# Patient Record
Sex: MALE | Race: WHITE
[De-identification: names, ages, dates, MRNs, and addresses within clinical notes are randomized per-mention and may not be internally consistent; named-entity substitution may affect disease eponyms.]

---

## 2018-01-03 ENCOUNTER — HOSPITAL ENCOUNTER (OUTPATIENT)
Dept: HOSPITAL 96 - M.LAB | Age: 83
End: 2018-01-03
Attending: INTERNAL MEDICINE
Payer: MEDICARE

## 2018-01-03 DIAGNOSIS — R10.9: Primary | ICD-10-CM

## 2018-01-03 DIAGNOSIS — R63.0: ICD-10-CM

## 2018-01-03 DIAGNOSIS — N40.0: ICD-10-CM

## 2018-01-03 DIAGNOSIS — K21.9: ICD-10-CM

## 2018-01-03 LAB
ALBUMIN SERPL-MCNC: 3.4 G/DL (ref 3.4–5)
ALP SERPL-CCNC: 104 U/L (ref 46–116)
ALT SERPL-CCNC: 17 U/L (ref 30–65)
ANION GAP SERPL CALC-SCNC: 9 MMOL/L (ref 7–16)
AST SERPL-CCNC: 14 U/L (ref 15–37)
BILIRUB SERPL-MCNC: 0.9 MG/DL
BUN SERPL-MCNC: 16 MG/DL (ref 7–18)
CALCIUM SERPL-MCNC: 8.5 MG/DL (ref 8.5–10.1)
CHLORIDE SERPL-SCNC: 103 MMOL/L (ref 98–107)
CO2 SERPL-SCNC: 27 MMOL/L (ref 21–32)
CREAT SERPL-MCNC: 1.1 MG/DL (ref 0.6–1.3)
GLUCOSE SERPL-MCNC: 121 MG/DL (ref 70–99)
HCT VFR BLD CALC: 43.9 % (ref 42–52)
HGB BLD-MCNC: 15 GM/DL (ref 14–18)
MCH RBC QN AUTO: 29.9 PG (ref 26–34)
MCHC RBC AUTO-ENTMCNC: 34.1 G/DL (ref 28–37)
MCV RBC: 87.8 FL (ref 80–100)
MPV: 8.1 FL. (ref 7.2–11.1)
PLATELET COUNT*: 209 THOU/UL (ref 150–400)
POTASSIUM SERPL-SCNC: 3.9 MMOL/L (ref 3.5–5.1)
PROT SERPL-MCNC: 7.2 G/DL (ref 6.4–8.2)
RBC # BLD AUTO: 5 MIL/UL (ref 4.5–6)
RDW-CV: 13.5 % (ref 10.5–14.5)
SODIUM SERPL-SCNC: 139 MMOL/L (ref 136–145)
WBC # BLD AUTO: 7.7 THOU/UL (ref 4–11)

## 2018-11-18 ENCOUNTER — HOSPITAL ENCOUNTER (INPATIENT)
Dept: HOSPITAL 96 - M.ERS | Age: 83
LOS: 2 days | Discharge: HOME | DRG: 309 | End: 2018-11-20
Attending: INTERNAL MEDICINE | Admitting: INTERNAL MEDICINE
Payer: MEDICARE

## 2018-11-18 VITALS — DIASTOLIC BLOOD PRESSURE: 89 MMHG | SYSTOLIC BLOOD PRESSURE: 153 MMHG

## 2018-11-18 VITALS — DIASTOLIC BLOOD PRESSURE: 93 MMHG | SYSTOLIC BLOOD PRESSURE: 153 MMHG

## 2018-11-18 VITALS — WEIGHT: 196 LBS | BODY MASS INDEX: 27.44 KG/M2 | HEIGHT: 70.98 IN

## 2018-11-18 VITALS — SYSTOLIC BLOOD PRESSURE: 112 MMHG | DIASTOLIC BLOOD PRESSURE: 86 MMHG

## 2018-11-18 VITALS — DIASTOLIC BLOOD PRESSURE: 79 MMHG | SYSTOLIC BLOOD PRESSURE: 144 MMHG

## 2018-11-18 DIAGNOSIS — Z79.899: ICD-10-CM

## 2018-11-18 DIAGNOSIS — E44.1: ICD-10-CM

## 2018-11-18 DIAGNOSIS — Z88.0: ICD-10-CM

## 2018-11-18 DIAGNOSIS — Z79.82: ICD-10-CM

## 2018-11-18 DIAGNOSIS — Z96.643: ICD-10-CM

## 2018-11-18 DIAGNOSIS — K21.9: ICD-10-CM

## 2018-11-18 DIAGNOSIS — I49.5: Primary | ICD-10-CM

## 2018-11-18 DIAGNOSIS — N40.0: ICD-10-CM

## 2018-11-18 DIAGNOSIS — M19.90: ICD-10-CM

## 2018-11-18 LAB
ABSOLUTE BASOPHILS: 0 THOU/UL (ref 0–0.2)
ABSOLUTE EOSINOPHILS: 0 THOU/UL (ref 0–0.7)
ABSOLUTE MONOCYTES: 0.5 THOU/UL (ref 0–1.2)
ALBUMIN SERPL-MCNC: 3.2 G/DL (ref 3.4–5)
ALP SERPL-CCNC: 93 U/L (ref 46–116)
ALT SERPL-CCNC: 14 U/L (ref 30–65)
ANION GAP SERPL CALC-SCNC: 9 MMOL/L (ref 7–16)
APTT BLD: 23 SECONDS (ref 25–31.3)
AST SERPL-CCNC: 12 U/L (ref 15–37)
BASOPHILS NFR BLD AUTO: 0.3 %
BILIRUB SERPL-MCNC: 0.8 MG/DL
BILIRUB UR-MCNC: NEGATIVE MG/DL
BUN SERPL-MCNC: 15 MG/DL (ref 7–18)
CALCIUM SERPL-MCNC: 8.8 MG/DL (ref 8.5–10.1)
CHLORIDE SERPL-SCNC: 104 MMOL/L (ref 98–107)
CO2 SERPL-SCNC: 26 MMOL/L (ref 21–32)
COLOR UR: YELLOW
CREAT SERPL-MCNC: 1.1 MG/DL (ref 0.6–1.3)
EOSINOPHIL NFR BLD: 0.6 %
GLUCOSE SERPL-MCNC: 105 MG/DL (ref 70–99)
GRANULOCYTES NFR BLD MANUAL: 80.6 %
HCT VFR BLD CALC: 42.5 % (ref 42–52)
HGB BLD-MCNC: 14.2 GM/DL (ref 14–18)
INR PPP: 1
KETONES UR STRIP-MCNC: NEGATIVE MG/DL
LYMPHOCYTES # BLD: 1 THOU/UL (ref 0.8–5.3)
LYMPHOCYTES NFR BLD AUTO: 12.2 %
MAGNESIUM SERPL-MCNC: 2 MG/DL (ref 1.8–2.4)
MCH RBC QN AUTO: 29.4 PG (ref 26–34)
MCHC RBC AUTO-ENTMCNC: 33.5 G/DL (ref 28–37)
MCV RBC: 87.9 FL (ref 80–100)
MONOCYTES NFR BLD: 6.3 %
MPV: 8.5 FL. (ref 7.2–11.1)
NEUTROPHILS # BLD: 6.5 THOU/UL (ref 1.6–8.1)
NUCLEATED RBCS: 0 /100WBC
PLATELET COUNT*: 210 THOU/UL (ref 150–400)
POTASSIUM SERPL-SCNC: 4.3 MMOL/L (ref 3.5–5.1)
PROT SERPL-MCNC: 6.9 G/DL (ref 6.4–8.2)
PROT UR QL STRIP: NEGATIVE
PROTHROMBIN TIME: 10 SECONDS (ref 9.2–11.5)
RBC # BLD AUTO: 4.84 MIL/UL (ref 4.5–6)
RBC # UR STRIP: NEGATIVE /UL
RDW-CV: 13.8 % (ref 10.5–14.5)
SODIUM SERPL-SCNC: 139 MMOL/L (ref 136–145)
SP GR UR STRIP: 1.01 (ref 1–1.03)
TROPONIN-I LEVEL: <0.06 NG/ML (ref ?–0.06)
URINE CLARITY: CLEAR
URINE GLUCOSE-RANDOM: NEGATIVE
URINE LEUKOCYTES-REFLEX: NEGATIVE
URINE NITRITE-REFLEX: NEGATIVE
UROBILINOGEN UR STRIP-ACNC: 0.2 E.U./DL (ref 0.2–1)
WBC # BLD AUTO: 8.1 THOU/UL (ref 4–11)

## 2018-11-18 NOTE — NUR
PATIENT ADMITTED TO ROOM 313 FROM ER. ALERT AND ORIENTED X 4, ALTHOUGH PATIENT
DID TELL NURSE HIS BIRTHDATE WAS [birth year] INSTEAD OF [birth year]. PATIENT QUICKLY
CORRECTED HIMSELF WHEN NURSE BROUGHT TO PATIENTS ATTENTION. ORTHOSTATICS
NEGATIVE, VITALS STABLE. SR ON TRACING ON CARDIAC MONITOR. NO SKIN BREAKDOWN
NOTED. REFUSING SCD'S. REG DIET. CARDIOLOGY AND NEURO CONS PLACED, AWAITING
CALL BACK. ECHO ORDERED FOR AM. IVF INFUSING X 2 BAGS PER ORDERS. ORIENTED TO
CALL LIGHT. FALL RISK PROTOCOL IN PLACE. CALL LIGHT WITHIN REACH, WILL
CONTINUE TO MONITOR.

## 2018-11-19 VITALS — SYSTOLIC BLOOD PRESSURE: 154 MMHG | DIASTOLIC BLOOD PRESSURE: 77 MMHG

## 2018-11-19 VITALS — SYSTOLIC BLOOD PRESSURE: 135 MMHG | DIASTOLIC BLOOD PRESSURE: 95 MMHG

## 2018-11-19 VITALS — DIASTOLIC BLOOD PRESSURE: 77 MMHG | SYSTOLIC BLOOD PRESSURE: 153 MMHG

## 2018-11-19 VITALS — SYSTOLIC BLOOD PRESSURE: 142 MMHG | DIASTOLIC BLOOD PRESSURE: 71 MMHG

## 2018-11-19 VITALS — DIASTOLIC BLOOD PRESSURE: 81 MMHG | SYSTOLIC BLOOD PRESSURE: 147 MMHG

## 2018-11-19 LAB
CHOLEST SERPL-MCNC: 186 MG/DL (ref ?–200)
EST. AVERAGE GLUCOSE BLD GHB EST-MCNC: 103 MG/DL
GLYCOHEMOGLOBIN (HGB A1C): 5.2 % (ref 4.8–5.6)
HDLC SERPL-MCNC: 51 MG/DL (ref 40–?)
LDLC SERPL-MCNC: 122 MG/DL (ref ?–100)
SERUM ASSESSMENT: CLEAR
TC:HDL: 3.6 RATIO
TRIGL SERPL-MCNC: 67 MG/DL (ref ?–150)
VLDLC SERPL CALC-MCNC: 13 MG/DL (ref ?–40)

## 2018-11-19 NOTE — NUR
SW met with pt to complete initial assessment, introduce self, and SW role.
Pt alert, oriented. Pt lives at home with wife and plans to dc there at dc.
Pt independent with adls and mobility.  Pt has hx of [facility] after
knee surgery years ago. Pt has a cane and RW if needed.  SW to continue to
follow.

## 2018-11-19 NOTE — NUR
ASSESSMENT COMPLETE. PT SLEPT THROUGH THE NIGHT WITHOUT ANY CONCERNS. NSR ON
TELE MONITOR. VITALS STABLE, AFEBRILE. PT IS ON ROOM AIR WITH ADEQUATE SATS.
DENIES PAIN AND N/V. PT IS FALL RISK, BED ALARM ON. STANDBY ASSIST TO
BATHROOM. IV FLUIDS INFUSING. SEE ASSESSMENT AND VITALS FOR OTHER DETAILS.
CALL LIGHT WITHIN REACH, WILL CONTINUE PLAN OF CARE

## 2018-11-19 NOTE — EKG
[place] [address]
Phone:  [phone]                     ELECTROCARDIOGRAM REPORT      
_______________________________________________________________________________
 
Name:       ANTELMO AHMADI                 Room:           [address]    ADM IN  
..#:  [id number]      Account #:      [id number]  
Admission:  18     Attend Phys:    Erick Voss MD 
Discharge:               Date of Birth:  [birth year]  
         Report #: [id number]
    [id number]
_______________________________________________________________________________
THIS REPORT FOR:  //name//                      
 
                         [facility] ED
                                       
Test Date:    2018               Test Time:    11:36:57
Pat Name:     ANTELMO AHMADI             Department:   
Patient ID:   [id number]            Room:         [facility]
Gender:                               Technician:   KASEY KEMP
:          1933               Requested By: Kassie Morillo
Order Number: [id number] MD:   Junior Mendez
                                 Measurements
Intervals                              Axis          
Rate:         58                       P:            35
HI:           176                      QRS:          26
QRSD:         85                       T:            56
QT:           429                                    
QTc:          422                                    
                           Interpretive Statements
Sinus rhythm
Abnormal R-wave progression, early transition
artifact noted
Compared to ECG 2015 09:28:03
no change
 
Electronically Signed On 2018 11:40:36 CST by Junior Mendez
[email or web address]
 
 
 
 
 
 
 
 
 
 
 
 
 
 
 
 
 
  <ELECTRONICALLY SIGNED>
                                           By: Junior Mendez MD, FACGER      
  18     1140
D: 18 1136   _____________________________________
T: 18 1136   Junior Mendez MD, [facility]        /EPI

## 2018-11-19 NOTE — NUR
DR. SARMIENTO FROM CARDIOLOGY SAW PATIENT THIS AM, AWAITING RECORDS FROM
[place] FOR FURTHER TESTING. DR. RICARDO SAW PATIENT THIS AM, ALSO AWAITING
RECORDS FROM [place]. IVF SL THIS SHIFT PER ORDERS. NO COMPLAINTS OF PAIN.
UP WITH SBA. DR. RICARDO WAS PAGED AND AWAITING CALLING BACK, ORDERS WERE TO BE
NOTIFIED WHEN RECORDS AVAILABLE. RECORDS PLACED ON FRONT OF CHART. CARDIAC
MONITOR TRACING SR WITH PVC'S.

## 2018-11-20 VITALS — SYSTOLIC BLOOD PRESSURE: 161 MMHG | DIASTOLIC BLOOD PRESSURE: 75 MMHG

## 2018-11-20 VITALS — SYSTOLIC BLOOD PRESSURE: 158 MMHG | DIASTOLIC BLOOD PRESSURE: 80 MMHG

## 2018-11-20 VITALS — SYSTOLIC BLOOD PRESSURE: 131 MMHG | DIASTOLIC BLOOD PRESSURE: 78 MMHG

## 2018-11-20 NOTE — NUR
PT DC'D HOME WITH ALL BELONGINGS. PT DENIES DIZZINESS OR LIGHTHEADEDNESS. IVS
REMOVED INTACT BEFORE DISMISSAL. PT ACKNOWLEDGED DC INSTRUCTIONS.

## 2018-11-20 NOTE — NUR
PT SLEPT WELL OVERNIGHT, UP WITH SBA TO BR TO VOID. TELE SR WITH OCC PAUSES.
SL IV. NO LABS THIS MORNING. DENIES PAIN OR PROBLEMS. ANTICIPATING DISCHARGE
HOME WITH MONITOR.

## 2018-11-24 NOTE — CON
[address]  [address]                    CONSULTATION                  
_______________________________________________________________________________
 
Name:       ANTELMO AHMADI                 Room:           [address] [place]  
.R.#:  [id number]      Account #:      [id number]  
Admission:  11/18/18     Attend Phys:    Erick Voss MD 
Discharge:  11/20/18     Date of Birth:  [birth year]  
         Report #: [id number]
                                                                     [id number]  
_______________________________________________________________________________
THIS REPORT FOR:  //name//                      
 
CC: Erick Romero
 
DATE OF SERVICE:  11/19/2018
 
 
HISTORY OF PRESENT ILLNESS:  This is an 85-year-old male patient who was
evaluated by me for looking for any neurological etiology for the patient's
syncope.  He gives a complicated history that he was in the [place], his heart
started racing.  He tried to see if he can get back to the bench, he never made
it there and he passed out.  There was no tonic-clonic activity.  There was no
postictal spell.  Apparently, he was at [facility] and they did an
extensive workup.  He also was in [place] and they also did an extensive
workup there.  He indicated he had an MRI done.  I do not have any of that
workup with me.
 
REVIEW OF SYSTEMS:  Indicate that his 14-point review of system was carried out.
 He does not have this symptom except recently.  He had some prior episodes, but
these were more severe.  He denies any prior history of stroke.  He has a
history of multiple injuries, but I do not know what the injuries were from. 
During this time, he did not have any injury.  He had some prostate tissue, but
he has taken Flomax for a long time without any change in dosages.  He had
multiple joint replaced.  Otherwise, he is not complaining of any eye, ENT,
cardiac, respiratory, GI, , musculoskeletal, constitutional, dermatological,
hematological, psychiatric, throat, allergic symptom associated with present
symptomatology.
 
PAST MEDICAL HISTORY:  Positive for similar syncope, but only recently.  He also
has some arthralgia.
 
FAMILY HISTORY:  Negative for any early age stroke.
 
SOCIAL HISTORY:  Unremarkable.
 
PHYSICAL EXAMINATION:  Indicate, he is alert, he is responsive.  He can follow
simple and complex command.  His cranial nerve examinations appear unremarkable.
 His strength, sensation, reflexes and tone is symmetrical.  There is no
cerebellar sign.  There is no papilledema.  There is no carotid bruit.  He is a
well-developed individual who does not have any dysmorphic features of eyes,
ears and face.  His vision and hearing looks adequate.  His pulses are palpable.
 He has no edema, cyanosis or jaundice.  Cardiac examinations appear
unremarkable.  No respiratory difficulty or rhonchi was noticed.  His blood
pressure is 147/81, respiration is 18, pulse is 87, temperature is 98.
 
 
 
 
[place] [address]                    CONSULTATION                  
_______________________________________________________________________________
 
Name:       ANTELMO AHMADI                 Room:           M.313-P    DIS IN  
M..#:  [id number]      Account #:      [id number]  
Admission:  11/18/18     Attend Phys:    Erick Voss MD 
Discharge:  11/20/18     Date of Birth:  [birth year]  
         Report #: [id number]
                                                                     [id number]  
_______________________________________________________________________________
LABORATORY DATA:  His white count is 8.0 and his sodium is normal.  He did have
a CT scan of the head, which was unremarkable.
 
IMPRESSION:   The patient's episode appeared to be cardiac in origin.  However,
I do not have prior records to see what workup was done at Home.  If he
did not have MRI/MRA and EEG, he needs one.  If he already had that, then the
workup should be directed to look for any cardiac cause for his symptoms.
 
RECOMMENDATIONS:
1.  They have already called for the record from the patient in Home.
2.  We will look at those records once is available.
3.  I discussed that aspect with the patient.  The patient does not want to
repeat that workup if he does not have to.  We will await the records and look
at it and decide about further management.
 
 
 
 
 
 
 
 
 
 
 
 
 
 
 
 
 
 
 
 
 
 
 
 
 
 
 
 
 
 
<ELECTRONICALLY SIGNED>
                                        By:  Seb Salomon MD             
11/24/18     1544
D: 11/19/18 1127_______________________________________
T: 11/19/18 1244Pfrancisco Salomon MD                /nt

## 2018-11-26 NOTE — CON
[address]  [address]                    CONSULTATION                  
_______________________________________________________________________________
 
Name:       ANTELMO AHMADI                 Room:           [address] [place]  
..#:  [id number]      Account #:      [id number]  
Admission:  11/18/18     Attend Phys:    Erick Voss MD 
Discharge:  11/20/18     Date of Birth:  [birth year]  
         Report #: [id number]
                                                                     [id number]  
_______________________________________________________________________________
THIS REPORT FOR:  //name//                      
 
CC: Charlie Romero
 
DATE OF SERVICE:  11/19/2018
 
 
HISTORY OF PRESENT ILLNESS:  The patient is an 85-year-old  white male
who I was asked to see in the hospital after he had a syncopal spell.  The
patient has had several hospitalizations here at [facility] in the past.  He was
actually admitted here back in 2009 with a dizzy spell.  He was felt to have
vertigo secondary to labyrinthitis.  In 2013, he was here at [facility] and
underwent hip surgery.  He was admitted here in 2015 and underwent shoulder
surgery.  He stays active, still working on his farm.  He notes that about 6
weeks ago he went to see his doctor.  In the waiting room he felt lightheaded. 
He apparently was admitted to the hospital in [place], [place] for 24 hours. 
He was then transferred to [place] for 3 days.  He apparently underwent
extensive evaluation.  No cause of the syncope was discovered.  He was
discharged on no new medications.  He has done well since that time until
yesterday.  He was in [place] when he felt lightheaded.  He tried to hold on to
something, but eventually fell to the ground.  He apparently got nauseated,
diaphoretic.  He suffered no trauma.  Paramedics brought him here to [facility]
and he was admitted.  He denies recent chest pain, shortness of breath.  He does
note occasional irregular heartbeat.
 
PAST MEDICAL HISTORY:  Significant for shoulder surgery, hip surgery, knee
surgery, prostatism.
 
MEDICATIONS:  His only medications include Flomax, Proscar.
 
ALLERGIES:  HE HAS AN ALLERGY TO PENICILLIN.
 
FAMILY HISTORY:  Negative for heart disease.
 
SOCIAL HISTORY:  He is , lives on a farm with his wife.  No smoking or
alcohol abuse.
 
REVIEW OF SYSTEMS:  He has no history of stroke, asthma.  He denied any recent
vomiting, bleeding.  No history of liver disease, kidney disease, cancer,
psychiatric illness, chronic skin condition.
 
PHYSICAL EXAMINATION:
GENERAL:  Revealed an elderly male, lying in bed, he appeared in no acute
distress.
 
 
 
[place] [address]                    CONSULTATION                  
_______________________________________________________________________________
 
Name:       ANTELMO AHMADI                 Room:           M.313-P    DIS IN  
M.R.#:  [id number]      Account #:      [id number]  
Admission:  11/18/18     Attend Phys:    Erick Voss MD 
Discharge:  11/20/18     Date of Birth:  [birth year]  
         Report #: [id number]
                                                                     [id number]  
_______________________________________________________________________________
VITAL SIGNS:  He had a blood pressure of 140/80, pulse is 80s, and afebrile.
HEENT:  He is anicteric.  Conjunctivae pink.  Mucous membranes moist.
NECK:  Veins nondistended.  No carotid bruits.  Neck supple.
CHEST:  Clear to auscultation.
CARDIAC:  Regular rate and rhythm.
ABDOMEN:  Soft, nontender.
EXTREMITIES:  Had no edema.  Posterior tibial pulse 2+ bilaterally.
SKIN:  Warm, dry.
NEUROLOGIC:  Nonfocal.
LYMPH:  No adenopathy.
MUSCULOSKELETAL:  No joint effusion.
 
LABORATORY DATA:  His ECG on admission showed sinus bradycardia, voltage
criteria for left ventricular hypertrophy, but no ST or T-wave change.  His
workup in the Emergency Room last night, he had a CT scan of the head without
contrast that showed no acute abnormality.  CT scan of the chest using a PE
protocol showed no evidence of pulmonary embolus, aortic dissection.  His lab
work, sodium 139, creatinine 1.1.  Troponin 0.06.  Cholesterol 186,
triglycerides 67, HDL 51, .  TSH 2.1.  White blood cell count 8.1,
hemoglobin 14.2.
 
IMPRESSION AND RECOMMENDATIONS:
1.  Syncope.  Suspect vasovagal.  I will review the results of the extensive
workup performed at [facility].  I would recommend no further cardiac workup at
this time.  I would suggest discharging the patient with an event recorder and I
will see the patient back in the clinic in 1 month for followup.
2.  Prostatism.
3.  Degenerative joint disease.
 
 
 
 
 
 
 
 
 
 
 
 
 
 
 
 
<ELECTRONICALLY SIGNED>
                                        By:  Junior Mendez MD, FACC      
11/26/18     1308
D: 11/19/18 0951_______________________________________
T: 11/19/18 1005Damegan Mendez MD, FACC         /nt